# Patient Record
Sex: MALE | Race: WHITE | NOT HISPANIC OR LATINO | URBAN - METROPOLITAN AREA
[De-identification: names, ages, dates, MRNs, and addresses within clinical notes are randomized per-mention and may not be internally consistent; named-entity substitution may affect disease eponyms.]

---

## 2018-12-04 ENCOUNTER — OFFICE VISIT - HEALTHEAST (OUTPATIENT)
Dept: FAMILY MEDICINE | Facility: CLINIC | Age: 28
End: 2018-12-04

## 2018-12-04 ENCOUNTER — COMMUNICATION - HEALTHEAST (OUTPATIENT)
Dept: FAMILY MEDICINE | Facility: CLINIC | Age: 28
End: 2018-12-04

## 2018-12-04 DIAGNOSIS — Z00.00 PREVENTATIVE HEALTH CARE: ICD-10-CM

## 2018-12-04 DIAGNOSIS — Z20.2 STD EXPOSURE: ICD-10-CM

## 2018-12-04 DIAGNOSIS — A63.0 GENITAL WARTS: ICD-10-CM

## 2018-12-04 DIAGNOSIS — R89.4 HERPES SIMPLEX ANTIBODY POSITIVE: ICD-10-CM

## 2018-12-04 DIAGNOSIS — B08.1 MOLLUSCUM CONTAGIOSUM INFECTION: ICD-10-CM

## 2018-12-04 LAB
ANION GAP SERPL CALCULATED.3IONS-SCNC: 8 MMOL/L (ref 5–18)
BASOPHILS # BLD AUTO: 0 THOU/UL (ref 0–0.2)
BASOPHILS NFR BLD AUTO: 1 % (ref 0–2)
BUN SERPL-MCNC: 15 MG/DL (ref 8–22)
CALCIUM SERPL-MCNC: 9.5 MG/DL (ref 8.5–10.5)
CHLORIDE BLD-SCNC: 104 MMOL/L (ref 98–107)
CHOLEST SERPL-MCNC: 114 MG/DL
CO2 SERPL-SCNC: 29 MMOL/L (ref 22–31)
CREAT SERPL-MCNC: 0.8 MG/DL (ref 0.7–1.3)
EOSINOPHIL # BLD AUTO: 0.4 THOU/UL (ref 0–0.4)
EOSINOPHIL NFR BLD AUTO: 7 % (ref 0–6)
ERYTHROCYTE [DISTWIDTH] IN BLOOD BY AUTOMATED COUNT: 12.4 % (ref 11–14.5)
FASTING STATUS PATIENT QL REPORTED: NO
GFR SERPL CREATININE-BSD FRML MDRD: >60 ML/MIN/1.73M2
GLUCOSE BLD-MCNC: 77 MG/DL (ref 70–125)
HCT VFR BLD AUTO: 44.3 % (ref 40–54)
HDLC SERPL-MCNC: 47 MG/DL
HGB BLD-MCNC: 15.3 G/DL (ref 14–18)
HIV 1+2 AB+HIV1 P24 AG SERPL QL IA: NEGATIVE
LDLC SERPL CALC-MCNC: 58 MG/DL
LYMPHOCYTES # BLD AUTO: 2.1 THOU/UL (ref 0.8–4.4)
LYMPHOCYTES NFR BLD AUTO: 37 % (ref 20–40)
MCH RBC QN AUTO: 29.2 PG (ref 27–34)
MCHC RBC AUTO-ENTMCNC: 34.6 G/DL (ref 32–36)
MCV RBC AUTO: 84 FL (ref 80–100)
MONOCYTES # BLD AUTO: 0.5 THOU/UL (ref 0–0.9)
MONOCYTES NFR BLD AUTO: 8 % (ref 2–10)
NEUTROPHILS # BLD AUTO: 2.8 THOU/UL (ref 2–7.7)
NEUTROPHILS NFR BLD AUTO: 48 % (ref 50–70)
PLATELET # BLD AUTO: 197 THOU/UL (ref 140–440)
PMV BLD AUTO: 8.7 FL (ref 7–10)
POTASSIUM BLD-SCNC: 4 MMOL/L (ref 3.5–5)
RBC # BLD AUTO: 5.25 MILL/UL (ref 4.4–6.2)
SODIUM SERPL-SCNC: 141 MMOL/L (ref 136–145)
TRIGL SERPL-MCNC: 46 MG/DL
WBC: 5.8 THOU/UL (ref 4–11)

## 2018-12-04 ASSESSMENT — MIFFLIN-ST. JEOR: SCORE: 1727.06

## 2018-12-05 LAB
C TRACH DNA SPEC QL PROBE+SIG AMP: NEGATIVE
HSV1 IGG SERPL QL IA: POSITIVE
HSV2 IGG SERPL QL IA: NEGATIVE
N GONORRHOEA DNA SPEC QL NAA+PROBE: NEGATIVE
T PALLIDUM AB SER QL: NEGATIVE

## 2018-12-06 LAB
LAB AP CHARGES (HE HISTORICAL CONVERSION): NORMAL
LAB MED GENERAL PATH INTERP (HE HISTORICAL CONVERSION): NORMAL
PATH REPORT.COMMENTS IMP SPEC: NORMAL
PATH REPORT.COMMENTS IMP SPEC: NORMAL
PATH REPORT.FINAL DX SPEC: NORMAL
PATH REPORT.MICROSCOPIC SPEC OTHER STN: NORMAL
PATH REPORT.RELEVANT HX SPEC: NORMAL
SPECIMEN DESCRIPTION: NORMAL

## 2018-12-10 ENCOUNTER — COMMUNICATION - HEALTHEAST (OUTPATIENT)
Dept: FAMILY MEDICINE | Facility: CLINIC | Age: 28
End: 2018-12-10

## 2021-06-02 VITALS — BODY MASS INDEX: 25.48 KG/M2 | HEIGHT: 69 IN | WEIGHT: 172 LBS

## 2021-06-16 PROBLEM — B08.1 MOLLUSCUM CONTAGIOSUM INFECTION: Status: ACTIVE | Noted: 2018-12-05

## 2021-06-16 PROBLEM — R89.4 HERPES SIMPLEX ANTIBODY POSITIVE: Status: ACTIVE | Noted: 2018-12-05

## 2021-06-22 NOTE — PROGRESS NOTES
"OFFICE VISIT NOTE      Assessment & Plan   Cosme Brizuela is a 28 y.o. male here for an STD screening. He has a girlfriend who requires he prove he's \"clean\". He wants to be tested for \"everything\" and he wants freezing done on the molluscum contagiosum on his penis.  We did the testing and the freezing. I did 3 freeze-thaw cycles on approximately 9 spots.    Diagnoses and all orders for this visit:    STD exposure  -     Chlamydia trachomatis & Neisseria gonorrhoeae, Amplified Detection  -     Herpes simplex Virus (Type 1 and 2) IgG Antibody  -     HIV Antigen/Antibody Screening [facility]  -     Medical Cytology  -     Syphilis Screen, [facility]    Preventative health care  -     Lipid Cascade RANDOM  -     HM1(CBC and Differential)  -     Basic Metabolic Panel  -     HM1 (CBC with Diff)    Genital warts  -     Medical Cytology    Herpes simplex antibody positive    Molluscum contagiosum infection    Other orders  -     Influenza, Seasonal,Quad Inj, 36+ MOS        Laila Fitzgerald MD            Subjective:   Chief Complaint:  std testing and Follow-up (discuss genital issue)    28 y.o. male.     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  He wants to prove to his girlfriend that he's clean. He's urinated today. He's not had any sex since his last check in Feb.    4) has musculosum contagiosum - had this frozen and was told he might need follow up  Scabs fell off, it is smaller but still there - 14 down to 8    5) tetanus 2/2018     No current outpatient medications on file.       PSFHx: appropriate sections of PMH completed/filled in   Tobacco Status:  He  reports that  has never smoked. he has never used smokeless tobacco.    Review of Systems:  No fever.  No penile discharge. All other systems negative except as noted above.    Objective:    BP 98/50   Pulse (!) 58   Temp 98  F " "(36.7  C) (Oral)   Resp 12   Ht 5' 9.09\" (1.755 m)   Wt 172 lb (78 kg)   SpO2 98%   BMI 25.33 kg/m    GENERAL: No acute distress.  : penis has 2 descended testicles, shaft of the penis has multiple flat, smooth-surfaced lesions raised 1mm off the base skin and 3-6mm in diameter.    With patient's verbal consent, I used liquid nitrogen to do 3 freeze-thaw cycles on every lesion; then I used a pap brush to try to gather any skin off the lesions and I sent that for HPV testing    Spent 40 min face to face with patient with more the 50% spent in counseling, reviewing chart, and coordination of care and discussing HPV, STIs, risks, STI treatments, + freezing treatment.        "

## 2021-06-26 ENCOUNTER — HEALTH MAINTENANCE LETTER (OUTPATIENT)
Age: 31
End: 2021-06-26

## 2021-10-16 ENCOUNTER — HEALTH MAINTENANCE LETTER (OUTPATIENT)
Age: 31
End: 2021-10-16

## 2022-07-23 ENCOUNTER — HEALTH MAINTENANCE LETTER (OUTPATIENT)
Age: 32
End: 2022-07-23

## 2022-10-01 ENCOUNTER — HEALTH MAINTENANCE LETTER (OUTPATIENT)
Age: 32
End: 2022-10-01

## 2023-08-06 ENCOUNTER — HEALTH MAINTENANCE LETTER (OUTPATIENT)
Age: 33
End: 2023-08-06